# Patient Record
Sex: FEMALE | Race: WHITE
[De-identification: names, ages, dates, MRNs, and addresses within clinical notes are randomized per-mention and may not be internally consistent; named-entity substitution may affect disease eponyms.]

---

## 2017-08-28 ENCOUNTER — HOSPITAL ENCOUNTER (OUTPATIENT)
Dept: HOSPITAL 80 - FED | Age: 47
Setting detail: OBSERVATION
LOS: 2 days | Discharge: HOME | End: 2017-08-30
Attending: SURGERY | Admitting: SURGERY
Payer: COMMERCIAL

## 2017-08-28 DIAGNOSIS — K90.0: ICD-10-CM

## 2017-08-28 DIAGNOSIS — D50.9: ICD-10-CM

## 2017-08-28 DIAGNOSIS — K35.80: Primary | ICD-10-CM

## 2017-08-28 DIAGNOSIS — K76.89: ICD-10-CM

## 2017-08-28 LAB
% IMMATURE GRANULYOCYTES: 0.2 % (ref 0–1.1)
ABSOLUTE IMMATURE GRANULOCYTES: 0.02 10^3/UL (ref 0–0.1)
ABSOLUTE NRBC COUNT: 0 10^3/UL (ref 0–0.01)
ADD DIFF?: NO
ADD MORPH?: NO
ADD SCAN?: NO
ALBUMIN SERPL-MCNC: 4.7 G/DL (ref 3.5–5)
ALP SERPL-CCNC: 69 IU/L (ref 38–126)
ALT SERPL-CCNC: 28 IU/L (ref 9–52)
ANION GAP SERPL CALC-SCNC: 13 MEQ/L (ref 8–16)
APTT BLD: 25.2 SEC (ref 23–38)
AST SERPL-CCNC: 21 IU/L (ref 14–46)
ATYPICAL LYMPHOCYTE FLAG: 10 (ref 0–99)
BACTERIA #/AREA URNS HPF: (no result) /HPF
BILIRUB SERPL-MCNC: 0.7 MG/DL (ref 0.1–1.4)
BILIRUBIN-CONJUGATED: 0.3 MG/DL (ref 0–0.5)
BILIRUBIN-UNCONJUGATED: 0.4 MG/DL (ref 0–1.1)
CALCIUM SERPL-MCNC: 9.8 MG/DL (ref 8.5–10.4)
CHLORIDE SERPL-SCNC: 104 MEQ/L (ref 97–110)
CO2 SERPL-SCNC: 22 MEQ/L (ref 22–31)
COLOR UR: (no result)
CREAT SERPL-MCNC: 0.7 MG/DL (ref 0.6–1)
ERYTHROCYTE [DISTWIDTH] IN BLOOD BY AUTOMATED COUNT: 12.5 % (ref 11.5–15.2)
FRAGMENT RBC FLAG: 0 (ref 0–99)
GFR SERPL CREATININE-BSD FRML MDRD: > 60 ML/MIN/{1.73_M2}
GLUCOSE SERPL-MCNC: 103 MG/DL (ref 70–100)
HCT VFR BLD CALC: 38.1 % (ref 38–47)
HGB BLD-MCNC: 13 G/DL (ref 12.6–16.3)
INR PPP: 1.02 (ref 0.83–1.16)
LEFT SHIFT FLG: 0 (ref 0–99)
LIPEMIA HEMOLYSIS FLAG: 90 (ref 0–99)
MCH RBC BLDCO QN: 30.7 PG (ref 27.9–34.1)
MCHC RBC AUTO-ENTMCNC: 34.1 G/DL (ref 32.4–36.7)
MCV RBC AUTO: 89.9 FL (ref 81.5–99.8)
MUCOUS THREADS #/AREA URNS LPF: (no result) /LPF
NITRITE UR QL STRIP: NEGATIVE
NRBC-AUTO%: 0 % (ref 0–0.2)
PH UR STRIP: 5 [PH] (ref 5–7.5)
PLATELET # BLD: 257 10^3/UL (ref 150–400)
PLATELET CLUMPS FLAG: 0 (ref 0–99)
PMV BLD AUTO: 10.7 FL (ref 8.7–11.7)
POTASSIUM SERPL-SCNC: 3.8 MEQ/L (ref 3.5–5.2)
PROT SERPL-MCNC: 8.2 G/DL (ref 6.3–8.2)
PROTHROMBIN TIME: 13.3 SEC (ref 12–15)
RBC # BLD AUTO: 4.24 10^6/UL (ref 4.18–5.33)
RBC #/AREA URNS HPF: (no result) /HPF (ref 0–3)
SODIUM SERPL-SCNC: 139 MEQ/L (ref 134–144)
SP GR UR STRIP: 1.01 (ref 1–1.03)
TROPONIN I SERPL-MCNC: < 0.012 NG/ML (ref 0–0.03)
WBC #/AREA URNS HPF: (no result) /HPF (ref 0–3)

## 2017-08-28 PROCEDURE — G0378 HOSPITAL OBSERVATION PER HR: HCPCS

## 2017-08-28 PROCEDURE — 96375 TX/PRO/DX INJ NEW DRUG ADDON: CPT

## 2017-08-28 PROCEDURE — 44970 LAPAROSCOPY APPENDECTOMY: CPT

## 2017-08-28 PROCEDURE — 99285 EMERGENCY DEPT VISIT HI MDM: CPT

## 2017-08-28 PROCEDURE — 96361 HYDRATE IV INFUSION ADD-ON: CPT

## 2017-08-28 PROCEDURE — 96376 TX/PRO/DX INJ SAME DRUG ADON: CPT

## 2017-08-28 PROCEDURE — 76705 ECHO EXAM OF ABDOMEN: CPT

## 2017-08-28 PROCEDURE — 47379 UNLISTED LAPS PX LIVER: CPT

## 2017-08-28 PROCEDURE — 96365 THER/PROPH/DIAG IV INF INIT: CPT

## 2017-08-28 NOTE — EDPHY
H & P


Stated Complaint: C/O abd pain x 4 days. Worsening last nigh tinto today.


HPI/ROS: 





HPI





CHIEF COMPLAINT:  Abdominal pain, nausea x4 days 





HISTORY OF PRESENT ILLNESS:  This patient very pleasant 47-year-old female she 

does have a history of celiac disease as well as iron deficiency anemia from 

menorrhagia, additionally she tells me she has nodular hyperplasia of her 

liver.  She presents emergency room stating that she has had 4 days of 

abdominal pain she describes a dull ache she thought it was gas pain that she 

thought was related to her iron that she takes.  She states she did see black 

stool 1 point.  She is unsure of her iron was causing this.  She has a 

gastroenterology appointment tomorrow.  She has not had any vomiting but she 

does report nausea.  She complains of mid abdominal pain dull ache.  And a 

little bit no right lower quadrant.  She does tell me distally gets worse after 

she eats and drinks.





Past Medical History:  Celiac disease, menorrhagia, iron deficiency anemia, 

nodular hyperplasia of her liver.





Past Surgical History:  No recent surgery





Social History:  Denies daily use drugs alcohol tobacco products.





Family History:  Noncontributory








ROS   


REVIEW OF SYSTEMS:


A comprehensive 10 point review of systems is otherwise negative aside from 

elements mentioned in the history of present illness.








Exam   


Constitutional   triage nursing summary reviewed, vital signs reviewed, awake/

alert. 


Eyes   normal conjunctivae and sclera, EOMI, PERRLA. 


HENT   normal inspection, atraumatic, moist mucus membranes, no epistaxis, neck 

supple/ no meningismus, no raccoon eyes. 


Respiratory   clear to auscultation bilaterally, normal breath sounds, no 

respiratory distress, no wheezing. 


Cardiovascular   rate normal, regular rhythm, no murmur, no edema, distal 

pulses normal. 


Gastrointestinal   soft, mild tender palpation right upper quadrant and right 

mid abdomen also a mild tenderness in the right lower quadrant, no rebound, no 

guarding, normal bowel sounds, no distension, no pulsatile mass. 


Genitourinary   no CVA tenderness. 


Musculoskeletal  no midline vertebral tenderness, full range of motion, no calf 

swelling, no tenderness of extremities, no meningismus, good pulses, 

neurovascularly intact.


Skin   pink, warm, & dry, no rash, skin atraumatic. 


Neurologic   awake, alert and oriented x 3, AAOx3, moves all 4 extremities 

equally, motor intact, sensory intact, CN II-XII intact, normal cerebellar, 

normal vision, normal speech. 


Psychiatric   normal mood/affect. 


Heme/Lymph/Immune   no lymphadenopathy.





Differential diagnosis includes but is not limited to and in no particular order

:  Bowel obstruction, appendicitis, gallbladder disease, diverticulitis, colitis

, enteritis, perforated viscus, gastritis, GERD, esophagitis, urinary tract 

infection, pyelonephritis, kidney stones





Medical Decision Making:  Plan for this patient IV establishment with blood draw

, IV fluids, Zofran for nausea GI cocktail, stool studies, abdominal blood work

, ultrasound right upper quadrant for gallbladder.  May need to proceed with CT 

scan.  Check pregnancy test and urinalysis.





Re-evaluation:








1227AM:  I did go re-evaluate this patient this time she tells me she feels 

much better after GI cocktail.  She is requesting discharge home.  She has a 

follow-up appoint with her internist and GI doctor tomorrow.  I went over her 

blood work with her and ultrasound report.  She does have these 2 nodular 

nonspecific liver masses that the patient states has been previously worked up 

for focal nodular hyperplasia.  I have given her ultrasound report.  Given that 

she feels better after GI cocktail it is possible she has an ulcer.  She still 

at this time going to provide us with a stool sample.  I did reexamine her 

abdomen is soft nontender no guarding or peritoneal signs.  She feels better 

would like to go home.  She has follow-up appoint with GI tomorrow.  She may 

need EGD for ulcer evaluation.  She does understand return emergency room she 

develops worsening abdominal pain fever vomiting.  Bloody stool.


Source: Patient





- Personal History


LMP (Females 10-55): 8-14 Days Ago


Current Tetanus/Diphtheria Vaccine: Unsure


Current Tetanus Diphtheria and Acellular Pertussis (TDAP): Unsure





- Medical/Surgical History


Hx Asthma: Yes


Hx Chronic Respiratory Disease: Yes


Hx Diabetes: No


Hx Cardiac Disease: No


Hx Renal Disease: No


Hx Cirrhosis: No


Hx Alcoholism: No


Hx HIV/AIDS: No


Hx Splenectomy or Spleen Trauma: No


Other PMH: ankylosing spondulosis, chrons disease, celiac disease, 

hashitoxicosis.





- Social History


Smoking Status: Former smoker


Constitutional: 


 Initial Vital Signs











Temperature (C)  37 C   08/28/17 21:05


 


Heart Rate  102 H  08/28/17 21:05


 


Respiratory Rate  18   08/28/17 21:05


 


Blood Pressure  139/74 H  08/28/17 21:05


 


O2 Sat (%)  98   08/28/17 21:05








 











O2 Delivery Mode               Room Air














Allergies/Adverse Reactions: 


 





levofloxacin [From Levaquin] Allergy (Verified 08/28/17 21:13)


 


nitrofurantoin [From Macrobid] Allergy (Verified 08/28/17 21:14)


 


Sulfa (Sulfonamide Antibiotics) Allergy (Verified 08/28/17 21:14)


 








Home Medications: 














 Medication  Instructions  Recorded


 


NK [No Known Home Meds]  08/28/17














Medical Decision Making





- Diagnostics


Imaging Results: 


 Imaging Impressions





Abdomen Ultrasound  08/28/17 22:24


Impression: 2 nonspecific liver masses. Patient states there is a history of 

focal nodular hyperplasia. Recommend comparing to prior imaging or obtaining a 

multiphase postcontrast MRI of the abdomen for further evaluation. Otherwise 

unremarkable right upper quadrant ultrasound.


 


Results called and discussed with Daniele Rivera MD at 8/28/2017 23:34.














- Data Points


Laboratory Results: 


 Laboratory Results





 08/28/17 21:40 





 08/28/17 21:40 





 











  08/28/17 08/28/17 08/28/17





  23:24 22:30 21:40


 


WBC      





    


 


RBC      





    


 


Hgb      





    


 


Hct      





    


 


MCV      





    


 


MCH      





    


 


MCHC      





    


 


RDW      





    


 


Plt Count      





    


 


MPV      





    


 


Neut % (Auto)      





    


 


Lymph % (Auto)      





    


 


Mono % (Auto)      





    


 


Eos % (Auto)      





    


 


Baso % (Auto)      





    


 


Nucleat RBC Rel Count      





    


 


Absolute Neuts (auto)      





    


 


Absolute Lymphs (auto)      





    


 


Absolute Monos (auto)      





    


 


Absolute Eos (auto)      





    


 


Absolute Basos (auto)      





    


 


Absolute Nucleated RBC      





    


 


Immature Gran %      





    


 


Immature Gran #      





    


 


PT      





    


 


INR      





    


 


APTT      





    


 


VBG Lactic Acid    0.7 mmol/L mmol/L  





    (0.7-2.1)  


 


Sodium      





    


 


Potassium      





    


 


Chloride      





    


 


Carbon Dioxide      





    


 


Anion Gap      





    


 


BUN      





    


 


Creatinine      





    


 


Estimated GFR      





    


 


Glucose      





    


 


Calcium      





    


 


Total Bilirubin      





    


 


Conjugated Bilirubin      





    


 


Unconjugated Bilirubin      





    


 


AST      





    


 


ALT      





    


 


Alkaline Phosphatase      





    


 


Troponin I      





    


 


Total Protein      





    


 


Albumin      





    


 


Lipase      





    


 


Beta HCG, Qual      NEGATIVE 





    


 


Urine Color  PALE YELLOW     





    


 


Urine Appearance  CLEAR     





    


 


Urine pH  5.0     





   (5.0-7.5)   


 


Ur Specific Gravity  1.006     





   (1.002-1.030)   


 


Urine Protein  NEGATIVE     





   (NEGATIVE)   


 


Urine Ketones  TRACE  H     





   (NEGATIVE)   


 


Urine Blood  2+  H     





   (NEGATIVE)   


 


Urine Nitrate  NEGATIVE     





   (NEGATIVE)   


 


Urine Bilirubin  NEGATIVE     





   (NEGATIVE)   


 


Urine Urobilinogen  NEGATIVE EU EU    





   (0.2-1.0)   


 


Ur Leukocyte Esterase  NEGATIVE     





   (NEGATIVE)   


 


Urine RBC  5-10 /hpf H /hpf    





   (0-3)   


 


Urine WBC  1-3 /hpf /hpf    





   (0-3)   


 


Ur Epithelial Cells  TRACE /lpf /lpf    





   (NONE-1+)   


 


Urine Bacteria  TRACE /hpf H /hpf    





   (NONE SEEN)   


 


Urine Mucus  TRACE /lpf /lpf    





   (NONE-1+)   


 


Urine Glucose  NEGATIVE     





   (NEGATIVE)   














  08/28/17 08/28/17 08/28/17





  21:40 21:40 21:40


 


WBC      





    


 


RBC      





    


 


Hgb      





    


 


Hct      





    


 


MCV      





    


 


MCH      





    


 


MCHC      





    


 


RDW      





    


 


Plt Count      





    


 


MPV      





    


 


Neut % (Auto)      





    


 


Lymph % (Auto)      





    


 


Mono % (Auto)      





    


 


Eos % (Auto)      





    


 


Baso % (Auto)      





    


 


Nucleat RBC Rel Count      





    


 


Absolute Neuts (auto)      





    


 


Absolute Lymphs (auto)      





    


 


Absolute Monos (auto)      





    


 


Absolute Eos (auto)      





    


 


Absolute Basos (auto)      





    


 


Absolute Nucleated RBC      





    


 


Immature Gran %      





    


 


Immature Gran #      





    


 


PT    13.3 SEC SEC  





    (12.0-15.0)  


 


INR    1.02   





    (0.83-1.16)  


 


APTT    25.2 SEC SEC  





    (23.0-38.0)  


 


VBG Lactic Acid      





    


 


Sodium      139 mEq/L mEq/L





     (134-144) 


 


Potassium      3.8 mEq/L mEq/L





     (3.5-5.2) 


 


Chloride      104 mEq/L mEq/L





     () 


 


Carbon Dioxide      22 mEq/l mEq/l





     (22-31) 


 


Anion Gap      13 mEq/L mEq/L





     (8-16) 


 


BUN      14 mg/dL mg/dL





     (7-23) 


 


Creatinine      0.7 mg/dL mg/dL





     (0.6-1.0) 


 


Estimated GFR      > 60 





    


 


Glucose      103 mg/dL H mg/dL





     () 


 


Calcium      9.8 mg/dL mg/dL





     (8.5-10.4) 


 


Total Bilirubin  0.7 mg/dL mg/dL    





   (0.1-1.4)   


 


Conjugated Bilirubin  0.3 mg/dL mg/dL    





   (0.0-0.5)   


 


Unconjugated Bilirubin  0.4 mg/dL mg/dL    





   (0.0-1.1)   


 


AST  21 IU/L IU/L    





   (14-46)   


 


ALT  28 IU/L IU/L    





   (9-52)   


 


Alkaline Phosphatase  69 IU/L IU/L    





   ()   


 


Troponin I  < 0.012 ng/mL ng/mL    





   (0.000-0.034)   


 


Total Protein  8.2 g/dL g/dL    





   (6.3-8.2)   


 


Albumin  4.7 g/dL g/dL    





   (3.5-5.0)   


 


Lipase  63 IU/L IU/L    





   ()   


 


Beta HCG, Qual      





    


 


Urine Color      





    


 


Urine Appearance      





    


 


Urine pH      





    


 


Ur Specific Gravity      





    


 


Urine Protein      





    


 


Urine Ketones      





    


 


Urine Blood      





    


 


Urine Nitrate      





    


 


Urine Bilirubin      





    


 


Urine Urobilinogen      





    


 


Ur Leukocyte Esterase      





    


 


Urine RBC      





    


 


Urine WBC      





    


 


Ur Epithelial Cells      





    


 


Urine Bacteria      





    


 


Urine Mucus      





    


 


Urine Glucose      





    














  08/28/17





  21:40


 


WBC  11.70 10^3/uL H 10^3/uL





   (3.80-9.50) 


 


RBC  4.24 10^6/uL 10^6/uL





   (4.18-5.33) 


 


Hgb  13.0 g/dL g/dL





   (12.6-16.3) 


 


Hct  38.1 % %





   (38.0-47.0) 


 


MCV  89.9 fL fL





   (81.5-99.8) 


 


MCH  30.7 pg pg





   (27.9-34.1) 


 


MCHC  34.1 g/dL g/dL





   (32.4-36.7) 


 


RDW  12.5 % %





   (11.5-15.2) 


 


Plt Count  257 10^3/uL 10^3/uL





   (150-400) 


 


MPV  10.7 fL fL





   (8.7-11.7) 


 


Neut % (Auto)  77.2 % H %





   (39.3-74.2) 


 


Lymph % (Auto)  15.0 % %





   (15.0-45.0) 


 


Mono % (Auto)  6.9 % %





   (4.5-13.0) 


 


Eos % (Auto)  0.4 % L %





   (0.6-7.6) 


 


Baso % (Auto)  0.3 % %





   (0.3-1.7) 


 


Nucleat RBC Rel Count  0.0 % %





   (0.0-0.2) 


 


Absolute Neuts (auto)  9.02 10^3/uL H 10^3/uL





   (1.70-6.50) 


 


Absolute Lymphs (auto)  1.76 10^3/uL 10^3/uL





   (1.00-3.00) 


 


Absolute Monos (auto)  0.81 10^3/uL H 10^3/uL





   (0.30-0.80) 


 


Absolute Eos (auto)  0.05 10^3/uL 10^3/uL





   (0.03-0.40) 


 


Absolute Basos (auto)  0.04 10^3/uL 10^3/uL





   (0.02-0.10) 


 


Absolute Nucleated RBC  0.00 10^3/uL 10^3/uL





   (0-0.01) 


 


Immature Gran %  0.2 % %





   (0.0-1.1) 


 


Immature Gran #  0.02 10^3/uL 10^3/uL





   (0.00-0.10) 


 


PT  





  


 


INR  





  


 


APTT  





  


 


VBG Lactic Acid  





  


 


Sodium  





  


 


Potassium  





  


 


Chloride  





  


 


Carbon Dioxide  





  


 


Anion Gap  





  


 


BUN  





  


 


Creatinine  





  


 


Estimated GFR  





  


 


Glucose  





  


 


Calcium  





  


 


Total Bilirubin  





  


 


Conjugated Bilirubin  





  


 


Unconjugated Bilirubin  





  


 


AST  





  


 


ALT  





  


 


Alkaline Phosphatase  





  


 


Troponin I  





  


 


Total Protein  





  


 


Albumin  





  


 


Lipase  





  


 


Beta HCG, Qual  





  


 


Urine Color  





  


 


Urine Appearance  





  


 


Urine pH  





  


 


Ur Specific Gravity  





  


 


Urine Protein  





  


 


Urine Ketones  





  


 


Urine Blood  





  


 


Urine Nitrate  





  


 


Urine Bilirubin  





  


 


Urine Urobilinogen  





  


 


Ur Leukocyte Esterase  





  


 


Urine RBC  





  


 


Urine WBC  





  


 


Ur Epithelial Cells  





  


 


Urine Bacteria  





  


 


Urine Mucus  





  


 


Urine Glucose  





  











Medications Given: 


 








Discontinued Medications





Al Hydroxide/Mg Hydroxide (Maalox Susp)  30 ml PO ONCE ONE


   Stop: 08/28/17 22:25


   Last Admin: 08/28/17 22:43 Dose:  30 ml


Sodium Chloride (Ns)  1,000 mls @ 0 mls/hr IV ONCE ONE


   PRN Reason: Wide Open


   Stop: 08/28/17 21:50


   Last Admin: 08/28/17 21:50 Dose:  1,000 mls








Departure





- Departure


Disposition: Home, Routine, Self-Care


Clinical Impression: 


Abdominal pain


Qualifiers:


 Abdominal location: generalized Qualified Code(s): R10.84 - Generalized 

abdominal pain





Condition: Good


Instructions:  Abdominal Pain (ED)


Additional Instructions: 


1. Return emergency room if develops worsening abdominal pain fever vomiting.


2. Please follow up with Gastroenterology tomorrow at your previously scheduled 

appointment as well as your internist.


Referrals: 


PERLA ALCARAZ [Primary Care Provider] - As per Instructions

## 2017-08-29 LAB
% IMMATURE GRANULYOCYTES: 0.3 % (ref 0–1.1)
ABSOLUTE IMMATURE GRANULOCYTES: 0.04 10^3/UL (ref 0–0.1)
ABSOLUTE NRBC COUNT: 0 10^3/UL (ref 0–0.01)
ADD DIFF?: NO
ADD MORPH?: NO
ADD SCAN?: NO
ATYPICAL LYMPHOCYTE FLAG: 10 (ref 0–99)
ERYTHROCYTE [DISTWIDTH] IN BLOOD BY AUTOMATED COUNT: 12.6 % (ref 11.5–15.2)
FRAGMENT RBC FLAG: 0 (ref 0–99)
HCT VFR BLD CALC: 31.8 % (ref 38–47)
HGB BLD-MCNC: 11 G/DL (ref 12.6–16.3)
LEFT SHIFT FLG: 10 (ref 0–99)
LIPEMIA HEMOLYSIS FLAG: 90 (ref 0–99)
MCH RBC BLDCO QN: 31.2 PG (ref 27.9–34.1)
MCHC RBC AUTO-ENTMCNC: 34.6 G/DL (ref 32.4–36.7)
MCV RBC AUTO: 90.1 FL (ref 81.5–99.8)
NRBC-AUTO%: 0 % (ref 0–0.2)
PLATELET # BLD: 222 10^3/UL (ref 150–400)
PLATELET CLUMPS FLAG: 0 (ref 0–99)
PMV BLD AUTO: 10.5 FL (ref 8.7–11.7)
RBC # BLD AUTO: 3.53 10^6/UL (ref 4.18–5.33)

## 2017-08-29 RX ADMIN — KETOROLAC TROMETHAMINE SCH MG: 15 INJECTION, SOLUTION INTRAMUSCULAR; INTRAVENOUS at 23:48

## 2017-08-29 RX ADMIN — KETOROLAC TROMETHAMINE SCH MG: 15 INJECTION, SOLUTION INTRAMUSCULAR; INTRAVENOUS at 17:45

## 2017-08-29 RX ADMIN — KETOROLAC TROMETHAMINE SCH MG: 15 INJECTION, SOLUTION INTRAMUSCULAR; INTRAVENOUS at 12:55

## 2017-08-29 RX ADMIN — HYDROMORPHONE HYDROCHLORIDE PRN MG: 1 INJECTION, SOLUTION INTRAMUSCULAR; INTRAVENOUS; SUBCUTANEOUS at 05:18

## 2017-08-29 RX ADMIN — HYDROMORPHONE HYDROCHLORIDE PRN MG: 1 INJECTION, SOLUTION INTRAMUSCULAR; INTRAVENOUS; SUBCUTANEOUS at 12:55

## 2017-08-29 RX ADMIN — HYDROMORPHONE HYDROCHLORIDE PRN MG: 1 INJECTION, SOLUTION INTRAMUSCULAR; INTRAVENOUS; SUBCUTANEOUS at 15:28

## 2017-08-29 RX ADMIN — KETOROLAC TROMETHAMINE SCH: 15 INJECTION, SOLUTION INTRAMUSCULAR; INTRAVENOUS at 06:26

## 2017-08-29 RX ADMIN — HYDROMORPHONE HYDROCHLORIDE PRN MG: 1 INJECTION, SOLUTION INTRAMUSCULAR; INTRAVENOUS; SUBCUTANEOUS at 05:37

## 2017-08-29 RX ADMIN — HYDROMORPHONE HYDROCHLORIDE PRN MG: 1 INJECTION, SOLUTION INTRAMUSCULAR; INTRAVENOUS; SUBCUTANEOUS at 17:54

## 2017-08-29 NOTE — PDANEPAE
ANE History of Present Illness





Acute appy





ANE Past Medical History





- Endocrine History


Hx Diabetes: No





ANE Review of Systems





- Exercise capacity


METS (RN): 4 METS





ANE Patient History





- Allergies


Allergies/Adverse Reactions: 








levofloxacin [From Levaquin] Allergy (Verified 08/28/17 21:13)


 


nitrofurantoin [From Macrobid] Allergy (Verified 08/28/17 21:14)


 


Sulfa (Sulfonamide Antibiotics) Allergy (Verified 08/28/17 21:14)


 








- Home Medications


Home medications: home medication list seen and reviewed


Home Medications: 








Klonopin  08/29/17 [Last Taken Unknown]








- NPO status


NPO Since - Liquids (Date): 08/28/17


NPO Since - Liquids (Time): 23:00


NPO Since - Solids (Date): 08/28/17


NPO Since - Solids (Time): 20:00





- Anes Hx


Anes Hx: post operative nausea and vomiting





- Smoking Hx


Smoking Status: Former smoker





ANE Labs/Vital Signs





- Labs


Result Diagrams: 


 08/28/17 21:40





 08/28/17 21:40





- Vital Signs


Blood Pressure: 123/74


Heart Rate: 99


Respiratory Rate: 20


O2 Sat (%): 100


Weight: 47.174 kg





ANE Physical Exam





- Airway


Neck exam: FROM


Mallampati Score: Class 1


Mouth exam: normal dental/mouth exam





- Pulmonary


Pulmonary: no respiratory distress





- Cardiovascular


Cardiovascular: regular rate and rhythym





- ASA Status


ASA Status: II, E





ANE Anesthesia Plan


Anesthesia Plan: general endotracheal anesthesia (RSI)

## 2017-08-29 NOTE — PDGENHP
History & Physical


Chief Complaint: RT SIDED ABDOMINAL PAIN FOR ONE WEEK


History of Present Illness: FEMALE WITH UNUSUAL PAIN RT SIDE WITH ANOREXIA AND 

NAUSEA. OVERALL FEELS VERY BAD. LMP 2 WEEKS AGO. -PREG TEST/ WBC 12K.  US SHOWS 

14 MM APPENDIX


Pertinent Past, Social, Family History: PMH:CELIAC/ FOCAL NODULAR HYPERPLASIA.  

ALL: LEVOQUIN, SULFA, CECLOR, NITROFURANTIN.  ROS -.  FAM HX -.  SOC: - TOB


Relevant Physical Exam: HEENT NONICTERIC, AFEBRILE,PEERLA, NO ORAL LESIONS.  

CHEST CLEAR.  COR RR WO M.  ABD SOFT, TENDER RLQ AND RUQ.  EXTREM: OK


Cardiorespiratory Assessment: IMPR: ACUTE APPENDICITIS.  PLAN LAP APPE/  RISKS 

AND OPTIONS FULLY DISCUSSED

## 2017-08-29 NOTE — ASMTCASEMG
Living Arrangements

 

What is your living arrangement? Who do you live      Answers:    With Partner                      
                              

with?                                                                                               
                              

Type Of Residence

 

What kind of residence do you live in?                Answers:    House                             
                              

Stairs in Home Environment                            Answers:    Yes                               
                              

Discharge Plan Comments

 

Coordination Status Comments                          

Notes:

Pt admitted with R quadrant abdominal pain. S/p lap appy and removal/biopsy of liver mass. Pt appea
red anxious and tearful when CM saw her. Hx celiac 

disease. Anticipate she will d/c with no CM needs but will continue to follow for any unanticipated
 needs.

 

Date Signed:  08/29/2017 03:24 PM

Electronically Signed By:Ivelisse Fuentes

## 2017-08-29 NOTE — POSTOPPROG
Post Op Note


Date of Operation: 08/29/17


Surgeon: Duong Pool


Anesthesiologist: SUNSHINE


Anesthesia: GET(General Endotracheal)


Pre-op Diagnosis: ACUTE APPE


Post-op Diagnosis: SAME + RT LOBE LIVER MASS


Indication: PAIN


Procedure: LAP APPE, LAP RESECTION OF RT LOBE LIVER MASS


Findings: SUBACUTE SWOLLEN APPENDIX,  4 CM RT LOBE LIVER MASS WITH ? INFARCTION


Inf/Abcess present in the surg proc area at time of surgery?: Yes


Depth: Organ Space


EBL: Minimal


Complications: 





0


Specimen(s): 





APPENDIX AND LIVER MASS

## 2017-08-30 ENCOUNTER — HOSPITAL ENCOUNTER (OUTPATIENT)
Dept: HOSPITAL 80 - FIMAGING | Age: 47
End: 2017-08-30
Attending: SURGERY
Payer: COMMERCIAL

## 2017-08-30 VITALS
DIASTOLIC BLOOD PRESSURE: 60 MMHG | HEART RATE: 72 BPM | SYSTOLIC BLOOD PRESSURE: 109 MMHG | OXYGEN SATURATION: 93 % | TEMPERATURE: 98.3 F | RESPIRATION RATE: 14 BRPM

## 2017-08-30 DIAGNOSIS — J90: Primary | ICD-10-CM

## 2017-08-30 LAB
% IMMATURE GRANULYOCYTES: 0.2 % (ref 0–1.1)
ABSOLUTE IMMATURE GRANULOCYTES: 0.02 10^3/UL (ref 0–0.1)
ABSOLUTE NRBC COUNT: 0 10^3/UL (ref 0–0.01)
ADD DIFF?: NO
ADD MORPH?: NO
ADD SCAN?: NO
ATYPICAL LYMPHOCYTE FLAG: 30 (ref 0–99)
ERYTHROCYTE [DISTWIDTH] IN BLOOD BY AUTOMATED COUNT: 12.9 % (ref 11.5–15.2)
FRAGMENT RBC FLAG: 0 (ref 0–99)
HCT VFR BLD CALC: 30.2 % (ref 38–47)
HGB BLD-MCNC: 9.9 G/DL (ref 12.6–16.3)
LEFT SHIFT FLG: 0 (ref 0–99)
LIPEMIA HEMOLYSIS FLAG: 80 (ref 0–99)
MCH RBC BLDCO QN: 30.6 PG (ref 27.9–34.1)
MCHC RBC AUTO-ENTMCNC: 32.8 G/DL (ref 32.4–36.7)
MCV RBC AUTO: 93.2 FL (ref 81.5–99.8)
NRBC-AUTO%: 0 % (ref 0–0.2)
PLATELET # BLD: 191 10^3/UL (ref 150–400)
PLATELET CLUMPS FLAG: 10 (ref 0–99)
PMV BLD AUTO: 11.4 FL (ref 8.7–11.7)
RBC # BLD AUTO: 3.24 10^6/UL (ref 4.18–5.33)

## 2017-08-30 RX ADMIN — KETOROLAC TROMETHAMINE SCH MG: 15 INJECTION, SOLUTION INTRAMUSCULAR; INTRAVENOUS at 06:24

## 2017-08-30 NOTE — ASDISCHSUM
----------------------------------------------

Discharge Information

----------------------------------------------

Plan Status:Home with No Needs                       Medically Cleared to Leave:

Discharge Date:08/30/2017 11:26 AM                   CM D/C Disposition:Home, Routine, Self-Care

ADT D/C Disposition:Home, Routine, Self-Care         Projected Discharge Date:08/30/2017 11:26 AM

Transportation at D/C:Self                           Discharge Delay Reason:

Follow-Up Date:08/30/2017 11:26 AM                   Discharge Slot:

Final Diagnosis:

----------------------------------------------

Placement Information

----------------------------------------------

----------------------------------------------

Patient Contact Information

----------------------------------------------

Contact Name:ALLYSSA                               Relationship:Friend

Address:                                             Home [phone]

                                                     Work Phone:

City:                                                [facility] Phone:

State/Zip Code:                                      Email:

----------------------------------------------

Financial Information

----------------------------------------------

Financial Class:HMO and PPO Plans

Primary Plan Desc:[employer] OUT [employer] STATE PPO                Primary Plan Number:[id number]

Secondary Plan Desc:                                 Secondary Plan Number:

 

 

----------------------------------------------

Assessment Information

----------------------------------------------

----------------------------------------------

[facility] Initial CM Assessment

----------------------------------------------

Living Arrangements

 

What is your living           Answers:  With Partner                          

arrangement? Who do you                                                       

live with?                                                                    

Type Of Residence

 

What kind of residence do     Answers:  House                                 

you live in?                                                                  

Stairs in Home                Answers:  Yes                                   

Environment                                                                   

Discharge Plan Comments

 

Coordination Status           

Comments                      

Notes:

Pt admitted with R quadrant abdominal pain. S/p lap appy and removal/biopsy of liver mass. Pt 

appeared anxious and tearful when CM saw her. Hx celiac disease. Anticipate she will d/c with no CM
 

needs but will continue to follow for any unanticipated needs.

 

Date Signed:  08/29/2017 03:24 PM

Electronically Signed By:Ivelisse Fuentes

 

 

----------------------------------------------

Intervention Information

----------------------------------------------

## 2017-09-05 ENCOUNTER — HOSPITAL ENCOUNTER (EMERGENCY)
Dept: HOSPITAL 80 - FED | Age: 47
Discharge: HOME | End: 2017-09-05
Payer: COMMERCIAL

## 2017-09-05 VITALS
OXYGEN SATURATION: 99 % | RESPIRATION RATE: 20 BRPM | SYSTOLIC BLOOD PRESSURE: 140 MMHG | HEART RATE: 80 BPM | DIASTOLIC BLOOD PRESSURE: 80 MMHG

## 2017-09-05 VITALS — TEMPERATURE: 98.8 F

## 2017-09-05 DIAGNOSIS — R10.11: Primary | ICD-10-CM

## 2017-09-05 DIAGNOSIS — G89.18: ICD-10-CM

## 2017-09-05 DIAGNOSIS — Z90.49: ICD-10-CM

## 2017-09-05 DIAGNOSIS — E86.9: ICD-10-CM

## 2017-09-05 DIAGNOSIS — Z87.891: ICD-10-CM

## 2017-09-05 LAB
% IMMATURE GRANULYOCYTES: 0.4 % (ref 0–1.1)
ABSOLUTE IMMATURE GRANULOCYTES: 0.03 10^3/UL (ref 0–0.1)
ABSOLUTE NRBC COUNT: 0 10^3/UL (ref 0–0.01)
ADD DIFF?: NO
ADD MORPH?: NO
ADD SCAN?: NO
ALBUMIN SERPL-MCNC: 4.4 G/DL (ref 3.5–5)
ALP SERPL-CCNC: 79 IU/L (ref 38–126)
ALT SERPL-CCNC: 40 IU/L (ref 9–52)
ANION GAP SERPL CALC-SCNC: 14 MEQ/L (ref 8–16)
AST SERPL-CCNC: 21 IU/L (ref 14–46)
ATYPICAL LYMPHOCYTE FLAG: 10 (ref 0–99)
BILIRUB SERPL-MCNC: 0.3 MG/DL (ref 0.1–1.4)
BILIRUBIN-CONJUGATED: 0.3 MG/DL (ref 0–0.5)
BILIRUBIN-UNCONJUGATED: 0 MG/DL (ref 0–1.1)
CALCIUM SERPL-MCNC: 9.7 MG/DL (ref 8.5–10.4)
CHLORIDE SERPL-SCNC: 102 MEQ/L (ref 97–110)
CO2 SERPL-SCNC: 24 MEQ/L (ref 22–31)
CREAT SERPL-MCNC: 0.7 MG/DL (ref 0.6–1)
ERYTHROCYTE [DISTWIDTH] IN BLOOD BY AUTOMATED COUNT: 12.1 % (ref 11.5–15.2)
FRAGMENT RBC FLAG: 0 (ref 0–99)
GFR SERPL CREATININE-BSD FRML MDRD: > 60 ML/MIN/{1.73_M2}
GLUCOSE SERPL-MCNC: 99 MG/DL (ref 70–100)
HCT VFR BLD CALC: 37.1 % (ref 38–47)
HGB BLD-MCNC: 12.3 G/DL (ref 12.6–16.3)
LEFT SHIFT FLG: 0 (ref 0–99)
LIPEMIA HEMOLYSIS FLAG: 80 (ref 0–99)
MCH RBC BLDCO QN: 30.1 PG (ref 27.9–34.1)
MCHC RBC AUTO-ENTMCNC: 33.2 G/DL (ref 32.4–36.7)
MCV RBC AUTO: 90.9 FL (ref 81.5–99.8)
NRBC-AUTO%: 0 % (ref 0–0.2)
PLATELET # BLD: 289 10^3/UL (ref 150–400)
PLATELET CLUMPS FLAG: 0 (ref 0–99)
PMV BLD AUTO: 10.6 FL (ref 8.7–11.7)
POTASSIUM SERPL-SCNC: 4.6 MEQ/L (ref 3.5–5.2)
PROT SERPL-MCNC: 7.5 G/DL (ref 6.3–8.2)
RBC # BLD AUTO: 4.08 10^6/UL (ref 4.18–5.33)
SODIUM SERPL-SCNC: 140 MEQ/L (ref 134–144)

## 2017-09-05 NOTE — EDPHY
H & P


Time Seen by Provider: 09/05/17 18:15


HPI/ROS: 





CHIEF COMPLAINT:  Right-sided abdominal pain





HISTORY OF PRESENT ILLNESS:  Patient had appendectomy and liver biopsy/

resection on 08/28 by Dr. Duong Pool.  She was doing better until Friday 

when she was folding clothes and suddenly felt very tired achy and had nausea.  

Saturday morning she woke up with pretty severe right upper quadrant abdominal 

pain which radiated to her back.  Continues to be severe and is a little bit 

better with Advil.  It is associated with worsening when she takes a deep 

breath and a little bit of a nonproductive cough.


Not associated with vomiting or recent trauma.  She has had maybe a subjective 

fever but no actual elevated temperature above 100.5 documented.


She was seen in her primary care physician's office here and sent for further 

evaluation.





REVIEW OF SYSTEMS:


Eye: no change in vision


ENT: no sore throat


Cardiac:  No syncope.


Pulmonary:  Not short of breath the pain is worse with deep respiration


Abdomen:  The HPI


Musculoskeletal: no back pain


Skin: no rash


Neuro: no headache


Constitutional: no fever


: no urinary symptoms





A comprehensive 10 point review of systems is otherwise negative aside from 

elements mentioned in the history of present illness.





PAST MEDICAL HISTORY:  Includes Crohn's disease, celiac, ankylosing 

spondylitis.  Focal nodular hyperplasia liver





Social history:  No alcohol, here with boyfriend





General Appearance: Alert and conversant, cooperative.


Eyes: No scleral  icterus. 


ENT, Mouth: Normal mucous membranes.


Respiratory: Normal respiratory effort, breath sounds equal, lungs are clear to 

auscultation.  Mild splinting.


Cardiovascular:  Regular rate and rhythm.


Gastrointestinal:  Right upper quadrant tenderness.  No tenderness over McBurney

's point.


Neurological: Alert and oriented x3.   Normally conversant. Face symmetric, 

normal movement and sensation in all extremities.


Skin: Warm and dry, no rashes.  Incisions covered with Steri-Strips clean dry 

and intact


Musculoskeletal: No peripheral edema and no joint swelling.


Psychiatric: Not agitated.





Emergency Department course/MDM:


Differential is broad and includes but not limited to postoperative bleeding, 

abscess, atelectasis, pulmonary embolism.


Screening with D-dimer discussed but I think that with recent liver biopsy it 

is likely to be elevated.  Declined additional pain medication.


CT scanning chest abdomen and pelvis discussed with the patient and consented.  

She is requesting not to be screened with D-dimer but proceed directly to CT.





2020:  Negative CT chest and abdomen per Liliana.  No pulmonary embolism or 

postoperative complications seen.


2040:  Results discussed with the patient.  I think it is reasonable to send 

her home with symptomatic treatment, patient states agreement with this plan.  

2042:  Discussed with Dr. Bertrand on-call for Dr. Pool.


Smoking Status: Former smoker


Constitutional: 


 Initial Vital Signs











Temperature (C)  36.8 C   09/05/17 17:32


 


Heart Rate  81   09/05/17 17:32


 


Respiratory Rate  20   09/05/17 17:32


 


Blood Pressure  118/69   09/05/17 17:32


 


O2 Sat (%)  99   09/05/17 17:32








 











O2 Delivery Mode               Room Air














Allergies/Adverse Reactions: 


 





gluten Allergy (Verified 09/05/17 17:31)


 


levofloxacin [From Levaquin] Allergy (Verified 09/05/17 17:31)


 


nitrofurantoin [From Macrobid] Allergy (Verified 09/05/17 17:31)


 


Sulfa (Sulfonamide Antibiotics) Allergy (Verified 09/05/17 17:31)


 








Home Medications: 














 Medication  Instructions  Recorded


 


Herbals/Supplements -Info Only 1 ea PO DAILY 08/29/17


 


Acetaminophen [Tylenol 325mg (*)] 650 mg PO Q4HRS PRN #0 tab 08/30/17


 


HYDROmorphone HCL [Dilaudid 2 mg 2 - 4 mg PO Q4HRS PRN #10 tab 08/30/17





(*)]  


 


Ibuprofen [Motrin (*)] 600 mg PO Q6H #40 tab 08/30/17


 


Ondansetron Odt [Zofran Odt 4 mg 4 mg PO Q6HRS PRN #14 tab 08/30/17





(*)]  














Medical Decision Making





- Diagnostics


Imaging Results: 


 Imaging Impressions





Abdomen CT  09/05/17 18:47


Impression: 


1.  Interval wedge resection of the right hepatic lobe lesion, with no visible 

complication.


2.  Hyperenhancing left hepatic lesion, indeterminate, possibly representing 

focal nodular hyperplasia, adenoma, or other etiology.


3.  No visible pulmonary embolus.


4.  Trace right pleural effusion.


 


Findings discussed with Rakan Torres M.D., on September 5, 2017 at 2019.  


 


E:amm








Chest/Thorax CTA  09/05/17 18:47


Impression: 


1.  Interval wedge resection of the right hepatic lobe lesion, with no visible 

complication.


2.  Hyperenhancing left hepatic lesion, indeterminate, possibly representing 

focal nodular hyperplasia, adenoma, or other etiology.


3.  No visible pulmonary embolus.


4.  Trace right pleural effusion.


 


Findings discussed with Rakan Torres M.D., on September 5, 2017 at 2019.  


 


E:amm














- Data Points


Laboratory Results: 


 Laboratory Results





 09/05/17 18:00 





 09/05/17 18:00 





 











  09/05/17 09/05/17 09/05/17





  18:16 18:00 18:00


 


WBC      8.45 10^3/uL 10^3/uL





     (3.80-9.50) 


 


RBC      4.08 10^6/uL L 10^6/uL





     (4.18-5.33) 


 


Hgb      12.3 g/dL L g/dL





     (12.6-16.3) 


 


Hct      37.1 % L %





     (38.0-47.0) 


 


MCV      90.9 fL fL





     (81.5-99.8) 


 


MCH      30.1 pg pg





     (27.9-34.1) 


 


MCHC      33.2 g/dL g/dL





     (32.4-36.7) 


 


RDW      12.1 % %





     (11.5-15.2) 


 


Plt Count      289 10^3/uL 10^3/uL





     (150-400) 


 


MPV      10.6 fL fL





     (8.7-11.7) 


 


Neut % (Auto)      67.9 % %





     (39.3-74.2) 


 


Lymph % (Auto)      24.1 % %





     (15.0-45.0) 


 


Mono % (Auto)      6.3 % %





     (4.5-13.0) 


 


Eos % (Auto)      0.8 % %





     (0.6-7.6) 


 


Baso % (Auto)      0.5 % %





     (0.3-1.7) 


 


Nucleat RBC Rel Count      0.0 % %





     (0.0-0.2) 


 


Absolute Neuts (auto)      5.74 10^3/uL 10^3/uL





     (1.70-6.50) 


 


Absolute Lymphs (auto)      2.04 10^3/uL 10^3/uL





     (1.00-3.00) 


 


Absolute Monos (auto)      0.53 10^3/uL 10^3/uL





     (0.30-0.80) 


 


Absolute Eos (auto)      0.07 10^3/uL 10^3/uL





     (0.03-0.40) 


 


Absolute Basos (auto)      0.04 10^3/uL 10^3/uL





     (0.02-0.10) 


 


Absolute Nucleated RBC      0.00 10^3/uL 10^3/uL





     (0-0.01) 


 


Immature Gran %      0.4 % %





     (0.0-1.1) 


 


Immature Gran #      0.03 10^3/uL 10^3/uL





     (0.00-0.10) 


 


Sodium    140 mEq/L mEq/L  





    (134-144)  


 


Potassium    4.6 mEq/L mEq/L  





    (3.5-5.2)  


 


Chloride    102 mEq/L mEq/L  





    ()  


 


Carbon Dioxide    24 mEq/l mEq/l  





    (22-31)  


 


Anion Gap    14 mEq/L mEq/L  





    (8-16)  


 


BUN    12 mg/dL mg/dL  





    (7-23)  


 


Creatinine    0.7 mg/dL mg/dL  





    (0.6-1.0)  


 


Estimated GFR    > 60   





    


 


Glucose    99 mg/dL mg/dL  





    ()  


 


Calcium    9.7 mg/dL mg/dL  





    (8.5-10.4)  


 


Total Bilirubin  0.3 mg/dL mg/dL    





   (0.1-1.4)   


 


Conjugated Bilirubin  0.3 mg/dL mg/dL    





   (0.0-0.5)   


 


Unconjugated Bilirubin  0.0 mg/dL mg/dL    





   (0.0-1.1)   


 


AST  21 IU/L IU/L    





   (14-46)   


 


ALT  40 IU/L IU/L    





   (9-52)   


 


Alkaline Phosphatase  79 IU/L IU/L    





   ()   


 


Total Protein  7.5 g/dL g/dL    





   (6.3-8.2)   


 


Albumin  4.4 g/dL g/dL    





   (3.5-5.0)   


 


Lipase  85 IU/L IU/L    





   ()   











Medications Given: 


 








Discontinued Medications





Sodium Chloride (Ns)  1,000 mls @ 0 mls/hr IV ONCE ONE; Wide Open


   PRN Reason: Protocol


   Stop: 09/05/17 17:57


   Last Admin: 09/05/17 18:17 Dose:  1,000 mls








Departure





- Departure


Disposition: Home, Routine, Self-Care


Clinical Impression: 


 Postoperative right upper quadrant abdominal pain





Abdominal pain


Qualifiers:


 Abdominal location: right upper quadrant Qualified Code(s): R10.11 - Right 

upper quadrant pain





Condition: Good


Instructions:  Acute Abdominal Pain (ED)


Referrals: 


PERLA ALCARAZ [Primary Care Provider] - As per Instructions


Duong Pool MD [Medical Doctor] - As per Instructions

## 2017-09-10 NOTE — GOP
[f rep st]



                                                                OPERATIVE REPORT





DATE OF OPERATION:  



SURGEON:  Duong Pool MD



ASSISTANT:  There was no assistant.



ANESTHESIOLOGIST:  Dr. Andrade.



PREOPERATIVE DIAGNOSIS:  Acute appendicitis and right lobe liver mass.



POSTOPERATIVE DIAGNOSIS:  Acute appendicitis and right lobe liver mass.



PROCEDURE PERFORMED:  Laparoscopic appendectomy and laparoscopic resection of right lobe liver mass.



FINDINGS:  The patient was found to have subacute swollen appendix.  She also had a 4 cm right lobe l
iver mass with questionable infarction.







DESCRIPTION OF PROCEDURE:  The patient was taken to the operating room, where she received satisfacto
ry general endotracheal anesthesia by Dr. Andrade.  She was placed in the supine position and pr
epped and draped in usual sterile fashion.  A periumbilical incision was made, a Veress needle insert
ed, and pneumoperitoneum was established.  Trocar was introduced.  Laparoscope introduced.  Good visu
alization was obtained.  Two other trocars were placed in the lower abdomen under direct vision.  The
 appendix was freed up from inflammatory adhesions.  It was quite swollen and inflamed.  The mesoappe
ndix was thickened.  It was divided with the Harmonic Scalpel.  The appendix was dissected back to th
e base in the cecum, where it was then divided with Endo-SOILA stapler, placed in a specimen bag and ex
tracted through the upper midline port site.  The wound was irrigated.  Hemostasis appeared to be zara
quate.  However, on looking in the upper abdomen, there was marked inflammation in the right lobe of 
the liver with some purulent exudate as well.  At first it was thought to be the gallbladder, but aft
er freeing the area up, it appeared to be a mass extending off the right lobe of the liver.  The gall
bladder was separate and not involved.  The mass was probably consistent with focal nodular hyperplas
ia.  An additional half of a 5 mm trocar was placed in the upper abdomen, and using the Harmonic Scal
pel, the mass was resected from the right lobe of the liver.  Hemostasis was obtained with the Dickerson
ic Scalpel and electrocautery.  The specimen was placed in a specimen bag and extracted through the u
mbilical midline port site, which had to be somewhat dilated and enlarged.  The specimen was removed.
  Hemostasis was assured.  Some Zahida hemostatic agent was placed over the cut edge of the liver as 
well as some omental tissue packed into that area.  Hemostasis appeared to be quite adequate.  The tr
ocars were removed under direct vision.  Trocar sites were closed with 0 Vicryl for the fascia, 4-0 M
onocryl subcuticular stitch for the skin.  All layers infiltrated with 0.5% Marcaine.  Blood loss was
 negligible.  Taken to recovery room in good condition.





Job #:  [id number]

## 2017-09-17 ENCOUNTER — HOSPITAL ENCOUNTER (EMERGENCY)
Dept: HOSPITAL 80 - FED | Age: 47
Discharge: HOME | End: 2017-09-17
Payer: COMMERCIAL

## 2017-09-17 VITALS — TEMPERATURE: 99.5 F | RESPIRATION RATE: 18 BRPM

## 2017-09-17 VITALS — DIASTOLIC BLOOD PRESSURE: 72 MMHG | OXYGEN SATURATION: 97 % | HEART RATE: 84 BPM | SYSTOLIC BLOOD PRESSURE: 112 MMHG

## 2017-09-17 DIAGNOSIS — J45.909: ICD-10-CM

## 2017-09-17 DIAGNOSIS — R10.31: ICD-10-CM

## 2017-09-17 DIAGNOSIS — G89.18: Primary | ICD-10-CM

## 2017-09-17 DIAGNOSIS — E86.9: ICD-10-CM

## 2017-09-17 LAB
% IMMATURE GRANULYOCYTES: 0.3 % (ref 0–1.1)
ABSOLUTE IMMATURE GRANULOCYTES: 0.02 10^3/UL (ref 0–0.1)
ABSOLUTE NRBC COUNT: 0 10^3/UL (ref 0–0.01)
ADD DIFF?: NO
ADD MORPH?: NO
ADD SCAN?: NO
ANION GAP SERPL CALC-SCNC: 15 MEQ/L (ref 8–16)
ATYPICAL LYMPHOCYTE FLAG: 10 (ref 0–99)
BACTERIA #/AREA URNS HPF: (no result) /HPF
CALCIUM SERPL-MCNC: 9.7 MG/DL (ref 8.5–10.4)
CHLORIDE SERPL-SCNC: 107 MEQ/L (ref 97–110)
CO2 SERPL-SCNC: 21 MEQ/L (ref 22–31)
COLOR UR: COLORLESS
CREAT SERPL-MCNC: 0.7 MG/DL (ref 0.6–1)
ERYTHROCYTE [DISTWIDTH] IN BLOOD BY AUTOMATED COUNT: 12.7 % (ref 11.5–15.2)
FRAGMENT RBC FLAG: 0 (ref 0–99)
GFR SERPL CREATININE-BSD FRML MDRD: > 60 ML/MIN/{1.73_M2}
GLUCOSE SERPL-MCNC: 95 MG/DL (ref 70–100)
HCT VFR BLD CALC: 38.4 % (ref 38–47)
HGB BLD-MCNC: 12.5 G/DL (ref 12.6–16.3)
LEFT SHIFT FLG: 0 (ref 0–99)
LIPEMIA HEMOLYSIS FLAG: 80 (ref 0–99)
MCH RBC BLDCO QN: 30 PG (ref 27.9–34.1)
MCHC RBC AUTO-ENTMCNC: 32.6 G/DL (ref 32.4–36.7)
MCV RBC AUTO: 92.1 FL (ref 81.5–99.8)
MUCOUS THREADS #/AREA URNS LPF: (no result) /LPF
NITRITE UR QL STRIP: NEGATIVE
NRBC-AUTO%: 0 % (ref 0–0.2)
PH UR STRIP: 6 [PH] (ref 5–7.5)
PLATELET # BLD: 276 10^3/UL (ref 150–400)
PLATELET CLUMPS FLAG: 0 (ref 0–99)
PMV BLD AUTO: 10.9 FL (ref 8.7–11.7)
POTASSIUM SERPL-SCNC: 4.2 MEQ/L (ref 3.5–5.2)
RBC # BLD AUTO: 4.17 10^6/UL (ref 4.18–5.33)
RBC #/AREA URNS HPF: (no result) /HPF (ref 0–3)
SODIUM SERPL-SCNC: 143 MEQ/L (ref 134–144)
SP GR UR STRIP: 1 (ref 1–1.03)
WBC #/AREA URNS HPF: (no result) /HPF (ref 0–3)

## 2017-09-17 NOTE — EDPHY
H & P


Stated Complaint: Rob Beltrán liver Rxsn, 08/29-redness to area 3 days.


Time Seen by Provider: 09/17/17 15:11


HPI/ROS: 





CHIEF COMPLAINT: Possible post-surgery infection





HISTORY OF PRESENT ILLNESS:





This patient is a 47 year old female complaining of abdominal pain, nausea, and 

an erythematous area on the right side of her abdomen first noted Wednesday, 

four days ago. She had a laparoscopic appendectomy and liver resection 8/29/17 

here at [facility] with Dr. Pool. About one week later, she saw 

her primary care physician, Dr. Higginbotham, due to an elevated temperature and 

malaise. She has had dizziness and asthma symptoms following her surgery, and 

has had continued abdominal pain, primarily right upper quadrant. She was 

evaluated in this emergency department 9/5/17 and had a CT scan at that time to 

rule out PE, which was negative. 





She now has a mottled area of skin on her right abdomen, and has had persistent 

nausea. She endorses frequent heating pad use. She states she pressed lightly 

on the mottled area and felt considerable pain deep in her abdomen.Thursday, 

she was unable to eat because of pain.  and her menstrual cycle began on 9/15/

17 two days ago. She is concerned Advil or Tylenol use may be causing her 

stomach symptoms. She endorses dysuria, has had right-sided kidney pain since 

her surgery. She currently has yeast infection due to antibiotic use, so is not 

sure whether her dysuria may be related to this.  She states she may have 

hematuria, but is not sure due to beginning her menstrual cycle. May have 

chronic kidney issue, has apparently had hematuria in the past. She endorses low

-grade fever.





 She denies chest pain, shortness of breath, abnormal bowel movements, or other 

associated symptoms. 





REVIEW OF SYSTEMS:





A ten point review of systems was performed and is negative with the exception 

of the items mentioned in the HPI and right heel pain.





Past medical history: Celiac disease. Ankylosing spondylitis. Vertigo. 

Bilateral hearing loss. History of Alma-Barr virus. Hashitoxicosis (becomes 

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

. 





Past surgical history:


Appendectomy on 8/29/2017 with resection of liver mass. 





Past medical records reviewed including Emergency Department visit and 

operative notes from 8/29/17. 





Family history:  Unspecified kidney disease. 





Social history: Friend at bedside. Single. Lives in [place]. Former tobacco 

user. 





General Appearance:  Alert.  Vital signs reviewed.  Blood pressure 141/82.


Eyes:  Pupils equal and round, no conjunctival injection, no discharge. 

Anicteric.


ENT, Mouth:  Mucous membranes are moist, no oropharyngeal erythema or edema.


Neck:  No lymphadenopathy, supple.


Respiratory:  Lungs are clear to auscultation; no wheezes, rales, or rhonchi.


Cardiovascular:  Regular rate and rhythm; no murmur, rub, or gallop.


Gastrointestinal:  Well-healing surgical incisions with some ecchymosis 

adjacent. Mottled appearance of lower right abdominal area--skin temperature is 

normal and consistent over the entire abdomen. Tenderness just below and 

slightly to the right of umbilicus, above 1 of the incisions--no guarding. 

Abdomen is soft, no masses or organomegaly, bowel sounds normal.


Skin:  Warm and dry, no rashes on exposed skin, normal color.


Back:  Nontender to palpation over the thoracolumbar spine. No CVAT.


Extremities:  No lower extremity edema, no calf tenderness or swelling.


Neurological:  Alert and oriented.  Moving all four extremities easily and 

equally.


Psychiatric:  Normal affect.





- Personal History


LMP (Females 10-55): Now


Current Tetanus/Diphtheria Vaccine: Yes


Tetanus Vaccine Date: 2015





- Medical/Surgical History


Hx Asthma: Yes


Hx Chronic Respiratory Disease: Yes


Hx Diabetes: No


Hx Cardiac Disease: No


Hx Renal Disease: No


Hx Cirrhosis: No


Hx Alcoholism: No


Hx HIV/AIDS: No


Hx Splenectomy or Spleen Trauma: No


Other PMH: ankylosing spondulosis, celiac disease, hashitoxicosis, liver wedge 

resection, NK Cell defieciency.





- Social History


Smoking Status: Former smoker


Constitutional: 


 Initial Vital Signs











Temperature (C)  37.5 C   09/17/17 14:34


 


Heart Rate  82   09/17/17 14:34


 


Respiratory Rate  18   09/17/17 14:34


 


Blood Pressure  141/82 H  09/17/17 14:34


 


O2 Sat (%)  97   09/17/17 14:34








 











O2 Delivery Mode               Room Air














Allergies/Adverse Reactions: 


 





nitrofurantoin [From Macrobid] Allergy (Severe, Verified 09/17/17 14:41)


 Dyspnea


levofloxacin [From Levaquin] Allergy (Intermediate, Verified 09/17/17 14:41)


 Diarrhea


Sulfa (Sulfonamide Antibiotics) Allergy (Unknown, Verified 09/17/17 14:41)


 


gluten Allergy (Verified 09/05/17 17:31)


 








Home Medications: 














 Medication  Instructions  Recorded


 


NK [No Known Home Meds]  09/17/17














Medical Decision Making


ED Course/Re-evaluation: 





47 year old female presents with four day history of nausea, abdominal pain, 

and mottled skin area on her abdomen. Physical exam reveals well-healing 

surgical incisions with some ecchymosis adjacent, no erythema, warmth, or 

discharge present. She has moderate tenderness just below and slightly to the 

right of her umbilicus, above incision. Plan for labs including CBC, BMP, and 

UA. IV established. Plan to administer 1L IV NS. 





Laboratory studies unremarkable. No evidence of systemic infection or acute 

electrolyte abnormalities. 





17:05 Reassessed patient. I assured her that the mottled skin area is not 

suggestive of cellulitis.  These marks could be due to the heating pad that she 

has been using intermittently.  She would like to be discharged home. I 

recommended follow up with Dr. Pool, her general surgeon, for further 

evaluation. She is asking about her TSH level but I recommended she follow up 

with her endocrinologist, Dr. Crespo, for more thorough endocrine workup.  





She continues with some right lower quadrant tenderness on exam, no guarding.  

White blood cell count is normal.  Temperature was repeated and is 37 degrees.  

Postoperative infection/abscess is certainly a possibility, although I do not 

think that the it is likely.  She had a CT scan of her chest and abdomen done 

on September 5th.  We discussed repeat scanning but at this point in time I do 

not recommend scan and she agrees.  Urine is not suggestive of urinary tract 

infection.  We discussed the danger signs that should prompt her to return 

immediately.





17:16 Consulted with Dr. Bertrand, general surgeon on call. She is aware of this 

patient, and is comfortable with outpatient followup for her. 





Plan to discharge the patient home in good condition. Follow up and return 

precautions discussed. She understands she should call Dr. Pool' office first 

thing tomorrow morning. She is comfortable with this plan. 


Differential Diagnosis: 





I considered a differential diagnosis that includes but is not limited to 

postoperative infection/abscess, cellulitis, normal postoperative pain, ovarian 

pathology, and urinary tract infection.





- Data Points


Laboratory Results: 


 Laboratory Results





 09/17/17 15:45 





 09/17/17 15:45 








Medications Given: 


 








Discontinued Medications





Sodium Chloride (Ns)  1,000 mls @ 0 mls/hr IV ONCE ONE; Wide Open


   PRN Reason: Protocol


   Stop: 09/17/17 16:13


   Last Admin: 09/17/17 16:15 Dose:  1,000 mls








Departure





- Departure


Disposition: Home, Routine, Self-Care


Clinical Impression: 


Abdominal pain


Qualifiers:


 Abdominal location: right lower quadrant Qualified Code(s): R10.31 - Right 

lower quadrant pain





Condition: Good


Instructions:  Abdominal Pain (ED)


Additional Instructions: 


1. Follow up with Dr. Pool tomorrow. Call his office first thing in the 

morning. 





2. Return to the emergency department for severe, unremitting abdominal pain, 

uncontrollable vomiting or diarrhea, temperature over 100.4, or other worsening 

of condition. 


Referrals: 


PERLA HIGGINBOTHAM [Primary Care Provider] - As per Instructions


Duong Pool MD [Medical Doctor] - As per Instructions


Report Scribed for: Gladis Joseph


Report Scribed by: Ute Beck


Date of Report: 09/17/17


Time of Report: 15:17


Physician Review and Approval Statement: 





09/17/17 15:16


Portions of this note were transcribed by the medical scribe.  I, Dr. Gladis Joseph, personally performed the history, physical exam, and medical decision-

making; and confirmed the accuracy of the information in the transcribed note.

## 2017-11-21 ENCOUNTER — HOSPITAL ENCOUNTER (OUTPATIENT)
Dept: HOSPITAL 80 - FIMAGING | Age: 47
End: 2017-11-21
Attending: GENERAL ACUTE CARE HOSPITAL
Payer: COMMERCIAL

## 2017-11-21 DIAGNOSIS — Z03.89: Primary | ICD-10-CM

## 2017-11-21 DIAGNOSIS — D13.4: ICD-10-CM

## 2017-11-21 PROCEDURE — A9585 GADOBUTROL INJECTION: HCPCS

## 2019-01-08 ENCOUNTER — HOSPITAL ENCOUNTER (OUTPATIENT)
Dept: HOSPITAL 80 - FIMAGING | Age: 49
End: 2019-01-08
Attending: OBSTETRICS & GYNECOLOGY
Payer: COMMERCIAL

## 2019-01-08 DIAGNOSIS — R92.8: Primary | ICD-10-CM

## 2019-06-10 ENCOUNTER — HOSPITAL ENCOUNTER (EMERGENCY)
Dept: HOSPITAL 80 - FED | Age: 49
Discharge: HOME | End: 2019-06-10
Payer: MEDICAID